# Patient Record
Sex: FEMALE | Race: OTHER | Employment: UNEMPLOYED | URBAN - METROPOLITAN AREA
[De-identification: names, ages, dates, MRNs, and addresses within clinical notes are randomized per-mention and may not be internally consistent; named-entity substitution may affect disease eponyms.]

---

## 2017-03-03 ENCOUNTER — HOSPITAL ENCOUNTER (EMERGENCY)
Age: 32
Discharge: HOME | End: 2017-03-04
Attending: EMERGENCY MEDICINE
Payer: SELF-PAY

## 2017-03-03 ENCOUNTER — APPOINTMENT (OUTPATIENT)
Dept: CT IMAGING | Age: 32
End: 2017-03-03
Attending: EMERGENCY MEDICINE
Payer: SELF-PAY

## 2017-03-03 DIAGNOSIS — N12 PYELONEPHRITIS: Primary | ICD-10-CM

## 2017-03-03 LAB
ALBUMIN SERPL BCP-MCNC: 3.6 G/DL (ref 3.5–5)
ALBUMIN/GLOB SERPL: 0.9 {RATIO} (ref 1.2–3.5)
ALP SERPL-CCNC: 105 U/L (ref 50–136)
ALT SERPL-CCNC: 13 U/L (ref 12–65)
ANION GAP BLD CALC-SCNC: 10 MMOL/L (ref 7–16)
AST SERPL W P-5'-P-CCNC: 9 U/L (ref 15–37)
BACTERIA URNS QL MICRO: 0 /HPF
BASOPHILS # BLD AUTO: 0 K/UL (ref 0–0.2)
BASOPHILS # BLD: 0 % (ref 0–2)
BILIRUB SERPL-MCNC: 0.6 MG/DL (ref 0.2–1.1)
BUN SERPL-MCNC: 6 MG/DL (ref 6–23)
CALCIUM SERPL-MCNC: 8.9 MG/DL (ref 8.3–10.4)
CASTS URNS QL MICRO: ABNORMAL /LPF
CHLORIDE SERPL-SCNC: 105 MMOL/L (ref 98–107)
CO2 SERPL-SCNC: 26 MMOL/L (ref 21–32)
CREAT SERPL-MCNC: 0.75 MG/DL (ref 0.6–1)
DIFFERENTIAL METHOD BLD: ABNORMAL
EOSINOPHIL # BLD: 0 K/UL (ref 0–0.8)
EOSINOPHIL NFR BLD: 0 % (ref 0.5–7.8)
EPI CELLS #/AREA URNS HPF: ABNORMAL /HPF
ERYTHROCYTE [DISTWIDTH] IN BLOOD BY AUTOMATED COUNT: 12.6 % (ref 11.9–14.6)
GLOBULIN SER CALC-MCNC: 3.9 G/DL (ref 2.3–3.5)
GLUCOSE SERPL-MCNC: 91 MG/DL (ref 65–100)
HCG UR QL: NEGATIVE
HCT VFR BLD AUTO: 34.9 % (ref 35.8–46.3)
HGB BLD-MCNC: 12.1 G/DL (ref 11.7–15.4)
IMM GRANULOCYTES # BLD: 0.1 K/UL (ref 0–0.5)
IMM GRANULOCYTES NFR BLD AUTO: 0.3 % (ref 0–5)
LIPASE SERPL-CCNC: 91 U/L (ref 73–393)
LYMPHOCYTES # BLD AUTO: 11 % (ref 13–44)
LYMPHOCYTES # BLD: 1.9 K/UL (ref 0.5–4.6)
MCH RBC QN AUTO: 29.9 PG (ref 26.1–32.9)
MCHC RBC AUTO-ENTMCNC: 34.7 G/DL (ref 31.4–35)
MCV RBC AUTO: 86.2 FL (ref 79.6–97.8)
MONOCYTES # BLD: 0.8 K/UL (ref 0.1–1.3)
MONOCYTES NFR BLD AUTO: 4 % (ref 4–12)
NEUTS SEG # BLD: 14.6 K/UL (ref 1.7–8.2)
NEUTS SEG NFR BLD AUTO: 85 % (ref 43–78)
PLATELET # BLD AUTO: 247 K/UL (ref 150–450)
PMV BLD AUTO: 11 FL (ref 10.8–14.1)
POTASSIUM SERPL-SCNC: 3.8 MMOL/L (ref 3.5–5.1)
PROT SERPL-MCNC: 7.5 G/DL (ref 6.3–8.2)
RBC # BLD AUTO: 4.05 M/UL (ref 4.05–5.25)
RBC #/AREA URNS HPF: ABNORMAL /HPF
SODIUM SERPL-SCNC: 141 MMOL/L (ref 136–145)
WBC # BLD AUTO: 17.3 K/UL (ref 4.3–11.1)
WBC URNS QL MICRO: >100 /HPF

## 2017-03-03 PROCEDURE — 96361 HYDRATE IV INFUSION ADD-ON: CPT | Performed by: EMERGENCY MEDICINE

## 2017-03-03 PROCEDURE — 99284 EMERGENCY DEPT VISIT MOD MDM: CPT | Performed by: EMERGENCY MEDICINE

## 2017-03-03 PROCEDURE — 80053 COMPREHEN METABOLIC PANEL: CPT | Performed by: EMERGENCY MEDICINE

## 2017-03-03 PROCEDURE — 81015 MICROSCOPIC EXAM OF URINE: CPT | Performed by: EMERGENCY MEDICINE

## 2017-03-03 PROCEDURE — 85025 COMPLETE CBC W/AUTO DIFF WBC: CPT | Performed by: EMERGENCY MEDICINE

## 2017-03-03 PROCEDURE — 96365 THER/PROPH/DIAG IV INF INIT: CPT | Performed by: EMERGENCY MEDICINE

## 2017-03-03 PROCEDURE — 74011250636 HC RX REV CODE- 250/636: Performed by: EMERGENCY MEDICINE

## 2017-03-03 PROCEDURE — 74011000258 HC RX REV CODE- 258: Performed by: EMERGENCY MEDICINE

## 2017-03-03 PROCEDURE — 74176 CT ABD & PELVIS W/O CONTRAST: CPT

## 2017-03-03 PROCEDURE — 81003 URINALYSIS AUTO W/O SCOPE: CPT | Performed by: EMERGENCY MEDICINE

## 2017-03-03 PROCEDURE — 96375 TX/PRO/DX INJ NEW DRUG ADDON: CPT | Performed by: EMERGENCY MEDICINE

## 2017-03-03 PROCEDURE — 83690 ASSAY OF LIPASE: CPT | Performed by: EMERGENCY MEDICINE

## 2017-03-03 PROCEDURE — 81025 URINE PREGNANCY TEST: CPT

## 2017-03-03 RX ORDER — ONDANSETRON 8 MG/1
8 TABLET, ORALLY DISINTEGRATING ORAL
Qty: 12 TAB | Refills: 0 | Status: SHIPPED | OUTPATIENT
Start: 2017-03-03

## 2017-03-03 RX ORDER — KETOROLAC TROMETHAMINE 30 MG/ML
30 INJECTION, SOLUTION INTRAMUSCULAR; INTRAVENOUS
Status: COMPLETED | OUTPATIENT
Start: 2017-03-03 | End: 2017-03-03

## 2017-03-03 RX ORDER — CEPHALEXIN 500 MG/1
500 CAPSULE ORAL 3 TIMES DAILY
Qty: 21 CAP | Refills: 0 | Status: SHIPPED | OUTPATIENT
Start: 2017-03-03 | End: 2017-03-10

## 2017-03-03 RX ORDER — HYDROCODONE BITARTRATE AND ACETAMINOPHEN 5; 325 MG/1; MG/1
1 TABLET ORAL
Qty: 17 TAB | Refills: 0 | Status: SHIPPED | OUTPATIENT
Start: 2017-03-03

## 2017-03-03 RX ORDER — MORPHINE SULFATE 4 MG/ML
4 INJECTION, SOLUTION INTRAMUSCULAR; INTRAVENOUS
Status: COMPLETED | OUTPATIENT
Start: 2017-03-03 | End: 2017-03-03

## 2017-03-03 RX ORDER — ONDANSETRON 2 MG/ML
4 INJECTION INTRAMUSCULAR; INTRAVENOUS
Status: COMPLETED | OUTPATIENT
Start: 2017-03-03 | End: 2017-03-03

## 2017-03-03 RX ADMIN — ONDANSETRON 4 MG: 2 INJECTION INTRAMUSCULAR; INTRAVENOUS at 22:30

## 2017-03-03 RX ADMIN — MORPHINE SULFATE 4 MG: 4 INJECTION, SOLUTION INTRAMUSCULAR; INTRAVENOUS at 22:31

## 2017-03-03 RX ADMIN — CEFTRIAXONE 1 G: 1 INJECTION, POWDER, FOR SOLUTION INTRAMUSCULAR; INTRAVENOUS at 22:30

## 2017-03-03 RX ADMIN — SODIUM CHLORIDE 1000 ML: 900 INJECTION, SOLUTION INTRAVENOUS at 22:30

## 2017-03-03 RX ADMIN — KETOROLAC TROMETHAMINE 30 MG: 30 INJECTION, SOLUTION INTRAMUSCULAR at 22:30

## 2017-03-04 VITALS
TEMPERATURE: 98.7 F | SYSTOLIC BLOOD PRESSURE: 118 MMHG | BODY MASS INDEX: 27.75 KG/M2 | OXYGEN SATURATION: 99 % | WEIGHT: 147 LBS | DIASTOLIC BLOOD PRESSURE: 71 MMHG | HEART RATE: 94 BPM | RESPIRATION RATE: 18 BRPM | HEIGHT: 61 IN

## 2017-03-04 NOTE — ED NOTES
I have reviewed medications, follow up provider options, and discharge instructions with the patient. The patient verbalized understanding. Copy of discharge information given to patient upon discharge. Prescription(s) given to patient. Patient discharged ambulatory in no distress. Patient instructed not to drive while under influence of narcotic pain medications. Hospital  used to discharge patient.

## 2017-03-04 NOTE — ED PROVIDER NOTES
HPI Comments: Patient presents to the ER complaining of some right flank pain since yesterday. Describes pain as achy, states pain is rate was a right groin. Reports some headache, nausea as well as vomiting. Reports subjective fevers and chills. Denies any vaginal discharge or vaginal bleeding. Does report some dysuria and some foul-smelling urine. Patient is a 27 y.o. female presenting with abdominal pain. The history is provided by the patient. The history is limited by a language barrier. A  was used. Abdominal Pain    This is a new problem. The current episode started more than 2 days ago. The problem occurs constantly. The problem has not changed since onset. Pain location: right flank. The pain is at a severity of 4/10. The pain is moderate. Associated symptoms include a fever, nausea, vomiting, dysuria, frequency and back pain. Pertinent negatives include no flatus, no melena, no arthralgias and no myalgias. Nothing worsens the pain. The pain is relieved by nothing. Her past medical history does not include gallstones, ulcerative colitis, pancreatitis, ectopic pregnancy, diverticulitis, kidney stones or small bowel obstruction. History reviewed. No pertinent past medical history. History reviewed. No pertinent surgical history. History reviewed. No pertinent family history. Social History     Social History    Marital status: SINGLE     Spouse name: N/A    Number of children: N/A    Years of education: N/A     Occupational History    Not on file. Social History Main Topics    Smoking status: Never Smoker    Smokeless tobacco: Not on file    Alcohol use No    Drug use: Not on file    Sexual activity: Not on file     Other Topics Concern    Not on file     Social History Narrative         ALLERGIES: Review of patient's allergies indicates no known allergies. Review of Systems   Constitutional: Positive for fever. Negative for diaphoresis.    HENT: Negative for congestion and dental problem. Eyes: Negative for photophobia and visual disturbance. Respiratory: Negative for chest tightness, shortness of breath and stridor. Cardiovascular: Negative for palpitations and leg swelling. Gastrointestinal: Positive for abdominal pain, nausea and vomiting. Negative for flatus and melena. Endocrine: Negative for polydipsia, polyphagia and polyuria. Genitourinary: Positive for dysuria, flank pain and frequency. Negative for vaginal bleeding and vaginal discharge. Musculoskeletal: Positive for back pain. Negative for arthralgias and myalgias. Skin: Negative for pallor and rash. Allergic/Immunologic: Negative for food allergies and immunocompromised state. Neurological: Negative for tremors, syncope, light-headedness and numbness. Psychiatric/Behavioral: Negative for behavioral problems and confusion. All other systems reviewed and are negative. Vitals:    03/03/17 2149   BP: 116/65   Pulse: (!) 104   Resp: 20   Temp: 98.7 °F (37.1 °C)   SpO2: 99%   Weight: 66.7 kg (147 lb)   Height: 5' 1\" (1.549 m)            Physical Exam   Constitutional: She appears well-developed and well-nourished. HENT:   Head: Normocephalic and atraumatic. Mouth/Throat: Oropharynx is clear and moist.   Eyes: Conjunctivae and EOM are normal. Pupils are equal, round, and reactive to light. No scleral icterus. Neck: Normal range of motion. No JVD present. No tracheal deviation present. No thyromegaly present. Cardiovascular: Normal rate, regular rhythm, normal heart sounds and intact distal pulses. Exam reveals no friction rub. No murmur heard. Pulmonary/Chest: Effort normal and breath sounds normal. No respiratory distress. Abdominal: Soft. Bowel sounds are normal. She exhibits no distension. There is no tenderness. There is CVA tenderness. Musculoskeletal: Normal range of motion. She exhibits no edema or deformity. Neurological: She is alert.  She has normal reflexes. No cranial nerve deficit. Coordination normal.   Skin: Skin is warm and dry. No erythema. Nursing note and vitals reviewed. MDM  Number of Diagnoses or Management Options  Diagnosis management comments: Right CVA tenderness, concern for pyelonephritis versus kidney stone    10:26 PM  Leukocytosis,  Urine Micro shows greater than 100 white blood cells, we'll treat for pyelonephritis. We'll sent for CT urogram to rule out kidney stone also    11:09 PM  CT urogram shows possible punctate distal ureteral stone  Patient otherwise is symptomatically improved. I discussed results of testing with her. We'll send home on pain medication and antibiotics. We will have her follow up with urology if symptoms continue       Amount and/or Complexity of Data Reviewed  Clinical lab tests: ordered and reviewed  Tests in the radiology section of CPT®: ordered and reviewed    Risk of Complications, Morbidity, and/or Mortality  Presenting problems: moderate  Diagnostic procedures: low  Management options: moderate    Patient Progress  Patient progress: stable    ED Course       Procedures    Results Include:    Recent Results (from the past 24 hour(s))   CBC WITH AUTOMATED DIFF    Collection Time: 03/03/17  9:56 PM   Result Value Ref Range    WBC 17.3 (H) 4.3 - 11.1 K/uL    RBC 4.05 4.05 - 5.25 M/uL    HGB 12.1 11.7 - 15.4 g/dL    HCT 34.9 (L) 35.8 - 46.3 %    MCV 86.2 79.6 - 97.8 FL    MCH 29.9 26.1 - 32.9 PG    MCHC 34.7 31.4 - 35.0 g/dL    RDW 12.6 11.9 - 14.6 %    PLATELET 675 239 - 546 K/uL    MPV 11.0 10.8 - 14.1 FL    DF AUTOMATED      NEUTROPHILS 85 (H) 43 - 78 %    LYMPHOCYTES 11 (L) 13 - 44 %    MONOCYTES 4 4.0 - 12.0 %    EOSINOPHILS 0 (L) 0.5 - 7.8 %    BASOPHILS 0 0.0 - 2.0 %    IMMATURE GRANULOCYTES 0.3 0.0 - 5.0 %    ABS. NEUTROPHILS 14.6 (H) 1.7 - 8.2 K/UL    ABS. LYMPHOCYTES 1.9 0.5 - 4.6 K/UL    ABS. MONOCYTES 0.8 0.1 - 1.3 K/UL    ABS. EOSINOPHILS 0.0 0.0 - 0.8 K/UL    ABS.  BASOPHILS 0.0 0.0 - 0.2 K/UL    ABS. IMM. GRANS. 0.1 0.0 - 0.5 K/UL   METABOLIC PANEL, COMPREHENSIVE    Collection Time: 03/03/17  9:56 PM   Result Value Ref Range    Sodium 141 136 - 145 mmol/L    Potassium 3.8 3.5 - 5.1 mmol/L    Chloride 105 98 - 107 mmol/L    CO2 26 21 - 32 mmol/L    Anion gap 10 7 - 16 mmol/L    Glucose 91 65 - 100 mg/dL    BUN 6 6 - 23 MG/DL    Creatinine 0.75 0.6 - 1.0 MG/DL    GFR est AA >60 >60 ml/min/1.73m2    GFR est non-AA >60 >60 ml/min/1.73m2    Calcium 8.9 8.3 - 10.4 MG/DL    Bilirubin, total 0.6 0.2 - 1.1 MG/DL    ALT (SGPT) 13 12 - 65 U/L    AST (SGOT) 9 (L) 15 - 37 U/L    Alk. phosphatase 105 50 - 136 U/L    Protein, total 7.5 6.3 - 8.2 g/dL    Albumin 3.6 3.5 - 5.0 g/dL    Globulin 3.9 (H) 2.3 - 3.5 g/dL    A-G Ratio 0.9 (L) 1.2 - 3.5     LIPASE    Collection Time: 03/03/17  9:56 PM   Result Value Ref Range    Lipase 91 73 - 393 U/L   HCG URINE, QL. - POC    Collection Time: 03/03/17 10:04 PM   Result Value Ref Range    Pregnancy test,urine (POC) NEGATIVE  NEG     URINE MICROSCOPIC    Collection Time: 03/03/17 10:10 PM   Result Value Ref Range    WBC >100 (H) 0 /hpf    RBC 5-10 0 /hpf    Epithelial cells 0-3 0 /hpf    Bacteria 0 0 /hpf    Casts 0-3 0 /lpf      CT UROGRAM WO CONT (Final result) Result time: 03/03/17 22:54:36     Final result by Schuyler Vang MD (03/03/17 22:54:36)     Impression:     IMPRESSION:     Mild right hydronephrosis with possible punctate right distal ureteral stone. Enlarged right kidney. Pyelonephritis cannot be ruled out without intravenous  contrast.    Status post cholecystectomy.      Evaluation of the abdominal viscera is limited without intravenous contrast.    Date of Dictation: 3/3/2017 10:52 PM       Narrative:     CT ABDOMEN AND PELVIS WITHOUT CONTRAST    HISTORY: Right flank pain    COMPARISON: None.     TECHNIQUE: Helical imaging was performed from the lung bases through the ischial  tuberosities without intravenous contrast. Oral contrast was not administered. Coronal and sagittal reformats were performed. Dose reduction techniques used: Automated exposure control, adjustment of the  mAs and/or kVp according to patient's size, and iterative reconstruction  techniques. FINDINGS:   *  LUNG BASES: Within normal limits. *  LIVER: Within normal limits. Janeece Meals AND BILE DUCTS: Status post cholecystectomy. *  SPLEEN: Within normal limits. Naomie Green TRACT: Enlarged right kidney with mild hydronephrosis. Possible  punctate right distal ureteral stone. *  ADRENALS: Within normal limits. *  PANCREAS: Within normal limits. *  GASTROINTESTINAL TRACT: Within normal limits. *  RETROPERITONEUM: Within normal limits. *  PERITONEAL CAVITY AND ABDOMINAL WALL: Within normal limits. *  PELVIS: Within normal limits. *  SPINE / BONES: Within normal limits. *  OTHER COMMENTS: None.

## 2017-03-30 ENCOUNTER — HOSPITAL ENCOUNTER (OUTPATIENT)
Dept: CT IMAGING | Age: 32
Discharge: HOME | End: 2017-03-30
Attending: NURSE PRACTITIONER
Payer: SELF-PAY

## 2017-03-30 DIAGNOSIS — N20.1 URETERAL CALCULUS: ICD-10-CM

## 2017-03-30 PROCEDURE — 74176 CT ABD & PELVIS W/O CONTRAST: CPT

## 2017-03-31 NOTE — PROGRESS NOTES
Let patient know that it does appear that she does have some right sided kidney swelling. There is no stone present. We will need to do a different test to see why her kidney swollen. MAG3 renal scan will be obtained.

## 2017-04-10 ENCOUNTER — HOSPITAL ENCOUNTER (OUTPATIENT)
Dept: NUCLEAR MEDICINE | Age: 32
Discharge: HOME | End: 2017-04-10
Attending: NURSE PRACTITIONER
Payer: SELF-PAY

## 2017-04-10 DIAGNOSIS — N13.30 HYDRONEPHROSIS, UNSPECIFIED HYDRONEPHROSIS TYPE: ICD-10-CM

## 2017-04-10 PROCEDURE — 74011250636 HC RX REV CODE- 250/636

## 2017-04-10 PROCEDURE — 78708 K FLOW/FUNCT IMAGE W/DRUG: CPT

## 2017-04-10 RX ORDER — FUROSEMIDE 10 MG/ML
40 INJECTION INTRAMUSCULAR; INTRAVENOUS
Status: COMPLETED | OUTPATIENT
Start: 2017-04-10 | End: 2017-04-10

## 2017-04-10 RX ADMIN — FUROSEMIDE 40 MG: 10 INJECTION, SOLUTION INTRAMUSCULAR; INTRAVENOUS at 12:00

## 2017-04-12 NOTE — PROGRESS NOTES
Please let pt know that the stone is gone. And her renal scan shows no obstruction. If she is still having pain may need to see PCP.

## 2022-10-10 ENCOUNTER — HOSPITAL ENCOUNTER (EMERGENCY)
Age: 37
Discharge: HOME | End: 2022-10-10
Attending: EMERGENCY MEDICINE

## 2022-10-10 ENCOUNTER — APPOINTMENT (OUTPATIENT)
Dept: GENERAL RADIOLOGY | Age: 37
End: 2022-10-10

## 2022-10-10 VITALS
DIASTOLIC BLOOD PRESSURE: 97 MMHG | OXYGEN SATURATION: 98 % | SYSTOLIC BLOOD PRESSURE: 159 MMHG | RESPIRATION RATE: 14 BRPM | TEMPERATURE: 97.9 F | HEART RATE: 94 BPM | BODY MASS INDEX: 39.27 KG/M2 | WEIGHT: 200 LBS | HEIGHT: 60 IN

## 2022-10-10 DIAGNOSIS — M25.562 ACUTE PAIN OF BOTH KNEES: Primary | ICD-10-CM

## 2022-10-10 DIAGNOSIS — M25.561 ACUTE PAIN OF BOTH KNEES: Primary | ICD-10-CM

## 2022-10-10 PROCEDURE — 73562 X-RAY EXAM OF KNEE 3: CPT

## 2022-10-10 PROCEDURE — 99283 EMERGENCY DEPT VISIT LOW MDM: CPT

## 2022-10-10 ASSESSMENT — ENCOUNTER SYMPTOMS
ABDOMINAL PAIN: 0
VOMITING: 0
SHORTNESS OF BREATH: 0
COLOR CHANGE: 0
NAUSEA: 0

## 2022-10-10 ASSESSMENT — PAIN SCALES - GENERAL: PAINLEVEL_OUTOF10: 5

## 2022-10-10 ASSESSMENT — LIFESTYLE VARIABLES
HOW OFTEN DO YOU HAVE A DRINK CONTAINING ALCOHOL: NEVER
HOW MANY STANDARD DRINKS CONTAINING ALCOHOL DO YOU HAVE ON A TYPICAL DAY: PATIENT DOES NOT DRINK

## 2022-10-10 ASSESSMENT — PAIN DESCRIPTION - LOCATION: LOCATION: KNEE

## 2022-10-10 ASSESSMENT — PAIN DESCRIPTION - DESCRIPTORS: DESCRIPTORS: ACHING;SHARP

## 2022-10-10 ASSESSMENT — PAIN - FUNCTIONAL ASSESSMENT: PAIN_FUNCTIONAL_ASSESSMENT: 0-10

## 2022-10-10 ASSESSMENT — PAIN DESCRIPTION - ORIENTATION: ORIENTATION: RIGHT;LEFT

## 2022-10-11 NOTE — ED NOTES
I have reviewed discharge instructions with the patient. The patient verbalized understanding. Patient left ED via Discharge Method: ambulatory to Home daughter with . Opportunity for questions and clarification provided. Patient given 0 scripts. To continue your aftercare when you leave the hospital, you may receive an automated call from our care team to check in on how you are doing. This is a free service and part of our promise to provide the best care and service to meet your aftercare needs.  If you have questions, or wish to unsubscribe from this service please call [phone]. Thank you for Choosing our [facility] Emergency Department.           Domingo Barr RN  10/10/22 1655

## 2022-10-11 NOTE — ED PROVIDER NOTES
Emergency Department Provider Note                   PCP:                None Provider               Age: 39 y.o. Sex: female       ICD-10-CM    1. Acute pain of both knees  M25.561     M25.562           DISPOSITION Decision To Discharge 10/10/2022 09:59:51 PM        MDM  Number of Diagnoses or Management Options  Acute pain of both knees  Diagnosis management comments: Patient is a 65-year-old female who presents to facility today with bilateral knee pain after a fall. On exam patient is well-appearing in no acute distress. Physical exam grossly unremarkable. Patient ambulating normally. X-rays of bilateral knees are negative for any acute fracture or dislocation. Discussed with her the diagnosis of likely knee contusions, essentially bone bruises to the knees, encouraged her to utilize Tylenol and ibuprofen as well as ice and some rest and to allow several days for the knees to start to feel back to normal.  This plan was relayed to her through her daughter interpreting and the patient reports understanding. She states she is agreeable to the treatment plan as discussed. Patient ambulatory out of the department in stable condition at this time. Amount and/or Complexity of Data Reviewed  Tests in the radiology section of CPT®: ordered and reviewed  Review and summarize past medical records: yes  Independent visualization of images, tracings, or specimens: yes    Risk of Complications, Morbidity, and/or Mortality  Presenting problems: low  Diagnostic procedures: low  Management options: low  General comments: XR KNEE RIGHT (3 VIEWS)   Final Result    No acute process. XR KNEE LEFT (3 VIEWS)   Final Result    No acute fracture. Patient Progress  Patient progress: stable             Orders Placed This Encounter   Procedures    XR KNEE RIGHT (3 VIEWS)    XR KNEE LEFT (3 VIEWS)        Medications - No data to display    There are no discharge medications for this patient.        Royce Potter Cruzito Yeager is a 39 y.o. female who presents to the Emergency Department with chief complaint of    Chief Complaint   Patient presents with    Knee Pain      Patient is a 44-year-old female who presents to facility today with bilateral knee pain after falling on the knees 2 days ago. She states she mainly feels the discomfort when ambulating but states she is still able to walk around. She states when she fully bends the left knee that is when she feels discomfort the most.  She states she has taken some Tylenol since the injury occurred but has tried no other at home remedies. She states she went to come here today to get some imaging to further assess her pain. She denies any significant past medical history, surgical history, or pertinent family history. The history is provided by the patient. The history is limited by a language barrier. A  was used (patient's daughter). Review of Systems   Constitutional:  Negative for chills and fever. Respiratory:  Negative for shortness of breath. Cardiovascular:  Negative for chest pain. Gastrointestinal:  Negative for abdominal pain, nausea and vomiting. Musculoskeletal:  Positive for arthralgias. Negative for gait problem and joint swelling. Skin:  Negative for color change and wound. Neurological:  Negative for dizziness, syncope and headaches. Psychiatric/Behavioral:  Negative for agitation and behavioral problems. All other systems reviewed and are negative. No past medical history on file. No past surgical history on file. No family history on file. Social History     Socioeconomic History    Marital status: Single         Patient has no allergy information on record. There are no discharge medications for this patient. Vitals signs and nursing note reviewed.    Patient Vitals for the past 4 hrs:   Temp Pulse Resp BP SpO2   10/10/22 2029 97.9 °F (36.6 °C) 94 14 (!) 159/97 98 %          Physical Exam  Vitals and nursing note reviewed. Constitutional:       General: She is not in acute distress. Appearance: Normal appearance. She is not ill-appearing. HENT:      Head: Normocephalic and atraumatic. Right Ear: External ear normal.      Left Ear: External ear normal.   Eyes:      Extraocular Movements: Extraocular movements intact. Conjunctiva/sclera: Conjunctivae normal.   Cardiovascular:      Rate and Rhythm: Normal rate and regular rhythm. Pulses: Normal pulses. Heart sounds: Normal heart sounds. Pulmonary:      Effort: Pulmonary effort is normal.      Breath sounds: Normal breath sounds. Abdominal:      General: Abdomen is flat. Musculoskeletal:         General: No swelling, tenderness or deformity. Normal range of motion. Cervical back: Normal range of motion. Right knee: Normal.      Left knee: Normal.      Right lower leg: No edema. Left lower leg: No edema. Skin:     General: Skin is warm and dry. Capillary Refill: Capillary refill takes less than 2 seconds. Findings: No bruising or erythema. Neurological:      General: No focal deficit present. Mental Status: She is alert and oriented to person, place, and time. Psychiatric:         Mood and Affect: Mood normal.         Behavior: Behavior normal.        Procedures    Results for orders placed or performed during the hospital encounter of 10/10/22   XR KNEE RIGHT (3 VIEWS)    Narrative    EXAM: Right knee x-rays. INDICATION: Pain after falling. COMPARISON: None. TECHNIQUE: 3 views. FINDINGS: No acute fracture, dislocation or joint effusion is seen. 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 Impression    No acute process. XR KNEE LEFT (3 VIEWS)    Narrative    EXAM: Left knee x-rays. INDICATION: Pain after falling. COMPARISON: None. TECHNIQUE: 3 views. FINDINGS: There is no acute fracture or dislocation.  Mild to moderate  tricompartmental osteoarthritis is present, with a moderate size knee joint  effusion. Impression    No acute fracture. XR KNEE RIGHT (3 VIEWS)   Final Result   No acute process. XR KNEE LEFT (3 VIEWS)   Final Result   No acute fracture. Voice dictation software was used during the making of this note. This software is not perfect and grammatical and other typographical errors may be present. This note has not been completely proofread for errors.      [employer], PA-C  10/10/22 5911

## 2022-10-11 NOTE — ED TRIAGE NOTES
Pt arrives PO ambulatory to ED with bilateral knee pain secondary to a fall 2 days ago. Pt advises she suffered a mechanical fall while shopping, after slipping on a wet floor. She advises her left leg went out in front of her, and her right leg went behind her. She further advises she has pain in both knees that is nonradiating while walking.

## 2022-10-11 NOTE — DISCHARGE INSTRUCTIONS
No fractures here today. You likely have some contusions to both knees which is contributing to your pain. Tylenol and ibuprofen as well as applying ice to the knees should help with inflammation. Follow-up with your family doctor as needed. I expect improvement over the next couple days. Return to the ED as needed for new or worsening symptoms.

## 2023-05-22 NOTE — DISCHARGE INSTRUCTIONS
Chronic asymptomatic patient on statin ,metoprolol and ASA   patient due to see Cardiology Infección renal: Instrucciones de cuidado - [ Kidney Infection: Care Instructions ]  Instrucciones de cuidado  Leon infección renal (pielonefritis) es un tipo de infección del tracto urinario (ITU). [address] ITU son infecciones de la vejiga. Las infecciones renales tienden a enfermar más a las personas que las infecciones de la vejiga. Arvell Fabio son más graves porque pueden causar daños duraderos si no se tratan con rapidez. La atención de seguimiento es leon parte clave de krishnamurthy tratamiento y seguridad. Asegúrese de hacer y acudir a todas las citas, y llame a krishnamurthy médico si está teniendo problemas. También es leon buena idea saber los resultados de los exámenes y mantener leon lista de los medicamentos que jordon. ¿Cómo puede cuidarse en el hogar? · [address] No deje de tomarlos por el hecho de sentirse mejor. Debe denise todos los antibióticos hasta terminarlos. · Kylee abundantes líquidos, suficientes para que krishnamurthy orina sea de color amarillo seema o lavonne kathy el agua. Ozone puede ayudar a eliminar las bacterias que causan la infección. Si tiene leon enfermedad del riñón, del corazón o del hígado y tiene que [place] líquidos, hable con krishnamurthy médico antes de aumentar krishnamurthy consumo. · Orine con frecuencia. Trate de vaciar la vejiga cada vez que orine. · Para aliviar el dolor, tome leon ducha caliente o colóquese leon almohadilla térmica (a baja temperatura) sobre la parte baja del abdomen. [place]ca se duerma mientras [place] leon almohadilla térmica. Póngase un paño garcia entre la almohadilla térmica y la piel. Cómo ayudar a prevenir las infecciones renales  · Kylee abundante agua todos los GRASSE. Ozone le ayuda a orinar con frecuencia, lo que elimina las bacterias de krishnamurthy sistema. Si tiene leon enfermedad del riñón, del corazón o del hígado y tiene que Kevin's líquidos, hable con krishnamurthy médico antes de aumentar krishnamurthy consumo.   · Stefany olivo [facility]") en krishnamurthy dieta. · Orine en cuanto sienta la necesidad de hacerlo. No retenga la [employer]. Orine antes de irse a dormir. · Si usted presenta síntomas de infección en la vejiga, kathy ardor al orinar u orinar con [place] Grieves a krishnamurthy médico para que trate el problema antes de que empeore. Si no trata leon infección de vejiga de inmediato, puede extenderse al riñón. · Los hombres deben mantener limpia la punta del pene. Si es tree, tenga en cuenta estas ideas:  · Orine inmediatamente después de claritza tenido [employer]. · Cámbiese las toallas sanitarias con frecuencia. Evite el uso de lavados vaginales, los [place] (\"sprays\") de higiene femenina y otros productos para la higiene femenina que contengan desodorantes. · Después de ir al baño, límpiese de adelante hacia atrás. ¿Cuándo debe pedir ayuda? Llame a krishnamurthy médico ahora mismo o busque atención médica inmediata si:  · Tiene dolor en aumento en la espalda omi debajo de las [place] se llama dolor en el flanco.  · Tiene fiebre nueva o más carlitos y escalofríos. · Tiene vómito o náuseas. Preste especial atención a los cambios en krishnamurthy kenrick y asegúrese de comunicarse con krishnamurthy médico si:  · Los síntomas, kathy el ardor al orinar, Todd Regulus a aparecer. · No está mejorando después de 2 días. ¿Dónde puede encontrar más información en inglés? Sandeep Hager a [email or web address]-heidi.info/. Jenny [place] [address] en la búsqueda para aprender más acerca de \"Infección renal: Instrucciones de cuidado - [ Kidney Infection: Care Instructions ]. \"  Revisado: 20 noviembre, 2015  Versión del contenido: 11.1  © 0083-0824 [employer]. Las instrucciones de cuidado fueron adaptadas bajo licencia por [employer] (which disclaims liability or warranty for this information). Si usted tiene [place] afección médica o sobre estas instrucciones, siempre pregunte a krishnamurthy profesional de kenrick.  [employer] niega toda garantía o responsabilidad por krishnamurthy uso de esta información.